# Patient Record
Sex: FEMALE | Race: WHITE | NOT HISPANIC OR LATINO
[De-identification: names, ages, dates, MRNs, and addresses within clinical notes are randomized per-mention and may not be internally consistent; named-entity substitution may affect disease eponyms.]

---

## 2017-10-16 ENCOUNTER — OTHER (OUTPATIENT)
Age: 57
End: 2017-10-16

## 2018-02-16 ENCOUNTER — TRANSCRIPTION ENCOUNTER (OUTPATIENT)
Age: 58
End: 2018-02-16

## 2018-12-11 ENCOUNTER — APPOINTMENT (OUTPATIENT)
Dept: OBGYN | Facility: CLINIC | Age: 58
End: 2018-12-11
Payer: COMMERCIAL

## 2018-12-11 VITALS
WEIGHT: 115 LBS | RESPIRATION RATE: 16 BRPM | SYSTOLIC BLOOD PRESSURE: 116 MMHG | BODY MASS INDEX: 21.16 KG/M2 | OXYGEN SATURATION: 98 % | DIASTOLIC BLOOD PRESSURE: 78 MMHG | HEIGHT: 62 IN | HEART RATE: 54 BPM

## 2018-12-11 DIAGNOSIS — Z13.820 ENCOUNTER FOR SCREENING FOR OSTEOPOROSIS: ICD-10-CM

## 2018-12-11 DIAGNOSIS — Z12.31 ENCOUNTER FOR SCREENING MAMMOGRAM FOR MALIGNANT NEOPLASM OF BREAST: ICD-10-CM

## 2018-12-11 DIAGNOSIS — A63.0 ANOGENITAL (VENEREAL) WARTS: ICD-10-CM

## 2018-12-11 DIAGNOSIS — K62.0 ANAL POLYP: ICD-10-CM

## 2018-12-11 DIAGNOSIS — Z87.42 PERSONAL HISTORY OF OTHER DISEASES OF THE FEMALE GENITAL TRACT: ICD-10-CM

## 2018-12-11 PROCEDURE — 99396 PREV VISIT EST AGE 40-64: CPT

## 2018-12-12 LAB — HPV HIGH+LOW RISK DNA PNL CVX: NOT DETECTED

## 2018-12-17 ENCOUNTER — APPOINTMENT (OUTPATIENT)
Dept: OBGYN | Facility: CLINIC | Age: 58
End: 2018-12-17
Payer: COMMERCIAL

## 2018-12-17 ENCOUNTER — ASOB RESULT (OUTPATIENT)
Age: 58
End: 2018-12-17

## 2018-12-17 LAB — CYTOLOGY CVX/VAG DOC THIN PREP: NORMAL

## 2018-12-17 PROCEDURE — 76830 TRANSVAGINAL US NON-OB: CPT

## 2019-01-11 ENCOUNTER — APPOINTMENT (OUTPATIENT)
Dept: OBGYN | Facility: CLINIC | Age: 59
End: 2019-01-11
Payer: COMMERCIAL

## 2019-01-11 VITALS
SYSTOLIC BLOOD PRESSURE: 110 MMHG | HEART RATE: 51 BPM | OXYGEN SATURATION: 98 % | HEIGHT: 62 IN | WEIGHT: 115 LBS | DIASTOLIC BLOOD PRESSURE: 70 MMHG | BODY MASS INDEX: 21.16 KG/M2

## 2019-01-11 PROCEDURE — 99214 OFFICE O/P EST MOD 30 MIN: CPT

## 2019-01-11 NOTE — CHIEF COMPLAINT
[Follow Up] : follow up GYN visit [FreeTextEntry1] : pt here today to discuss genetics test report-CHEK 2 pos

## 2019-01-25 ENCOUNTER — OTHER (OUTPATIENT)
Age: 59
End: 2019-01-25

## 2019-01-26 ENCOUNTER — OTHER (OUTPATIENT)
Age: 59
End: 2019-01-26

## 2019-08-25 ENCOUNTER — TRANSCRIPTION ENCOUNTER (OUTPATIENT)
Age: 59
End: 2019-08-25

## 2020-04-29 ENCOUNTER — TRANSCRIPTION ENCOUNTER (OUTPATIENT)
Age: 60
End: 2020-04-29

## 2020-06-25 ENCOUNTER — APPOINTMENT (OUTPATIENT)
Dept: OBGYN | Facility: CLINIC | Age: 60
End: 2020-06-25
Payer: COMMERCIAL

## 2020-06-25 VITALS
BODY MASS INDEX: 21.9 KG/M2 | HEIGHT: 62 IN | SYSTOLIC BLOOD PRESSURE: 110 MMHG | WEIGHT: 119 LBS | DIASTOLIC BLOOD PRESSURE: 70 MMHG | HEART RATE: 63 BPM | TEMPERATURE: 97.8 F | OXYGEN SATURATION: 99 %

## 2020-06-25 DIAGNOSIS — R10.2 PELVIC AND PERINEAL PAIN: ICD-10-CM

## 2020-06-25 DIAGNOSIS — N83.201 UNSPECIFIED OVARIAN CYST, RIGHT SIDE: ICD-10-CM

## 2020-06-25 DIAGNOSIS — R92.8 OTHER ABNORMAL AND INCONCLUSIVE FINDINGS ON DIAGNOSTIC IMAGING OF BREAST: ICD-10-CM

## 2020-06-25 PROCEDURE — 99396 PREV VISIT EST AGE 40-64: CPT

## 2020-06-26 NOTE — HISTORY OF PRESENT ILLNESS
[Postmenopausal] : is postmenopausal [Good] : being in good health [HPV Vaccine NA Due to Age] : HPV vaccine not available to patient due to age [Sexually Active] : is sexually active [Last Bone Density ___] : Last bone density studies [unfilled] [Last Mammogram ___] : Last Mammogram was [unfilled] [Last Colonoscopy ___] : Last colonoscopy [unfilled] [Last Pap ___] : Last cervical pap smear was [unfilled]

## 2020-06-26 NOTE — CHIEF COMPLAINT
[Annual Visit] : annual visit [FreeTextEntry1] : --mom 42--colon,patGM pancreatic 70s\par pap 2015,mammo 2017,colon 2018\par Rec Relizen for hot flashes\par Pt CHEK 2 pos--pt seeing breast MD and f/u with colorectal MD as well

## 2020-06-26 NOTE — PHYSICAL EXAM
[Awake] : awake [Alert] : alert [Oriented x3] : oriented to person, place, and time [Soft] : soft [No Bleeding] : there was no active vaginal bleeding [Normal] : uterus [Uterine Adnexae] : were not tender and not enlarged [Acute Distress] : no acute distress [Mass] : no breast mass [Axillary LAD] : no axillary lymphadenopathy [Nipple Discharge] : no nipple discharge [Tender] : non tender

## 2020-06-27 LAB — HPV HIGH+LOW RISK DNA PNL CVX: NOT DETECTED

## 2020-06-30 LAB — CYTOLOGY CVX/VAG DOC THIN PREP: ABNORMAL

## 2021-01-27 ENCOUNTER — TRANSCRIPTION ENCOUNTER (OUTPATIENT)
Age: 61
End: 2021-01-27

## 2021-05-19 ENCOUNTER — TRANSCRIPTION ENCOUNTER (OUTPATIENT)
Age: 61
End: 2021-05-19

## 2021-06-28 ENCOUNTER — APPOINTMENT (OUTPATIENT)
Dept: OBGYN | Facility: CLINIC | Age: 61
End: 2021-06-28
Payer: COMMERCIAL

## 2021-06-28 VITALS
WEIGHT: 112 LBS | SYSTOLIC BLOOD PRESSURE: 112 MMHG | DIASTOLIC BLOOD PRESSURE: 75 MMHG | HEIGHT: 62 IN | HEART RATE: 69 BPM | BODY MASS INDEX: 20.61 KG/M2

## 2021-06-28 PROCEDURE — 99396 PREV VISIT EST AGE 40-64: CPT

## 2021-06-28 PROCEDURE — 99072 ADDL SUPL MATRL&STAF TM PHE: CPT

## 2021-06-28 NOTE — HISTORY OF PRESENT ILLNESS
[Good] : being in good health [Last Mammogram ___] : Last Mammogram was [unfilled] [Last Bone Density ___] : Last bone density studies [unfilled] [Last Colonoscopy ___] : Last colonoscopy [unfilled] [Last Pap ___] : Last cervical pap smear was [unfilled] [Sexually Active] : is sexually active [Postmenopausal] : is postmenopausal [HPV Vaccine NA Due to Age] : HPV vaccine not available to patient due to age [TextBox_4] : pap 2020\par mammo 2021\par colon 2019\par dexa 2019\par son 28 yo [employer]--stationed in [place] and working with the t

## 2021-10-02 ENCOUNTER — TRANSCRIPTION ENCOUNTER (OUTPATIENT)
Age: 61
End: 2021-10-02

## 2022-08-02 ENCOUNTER — APPOINTMENT (OUTPATIENT)
Dept: OBGYN | Facility: CLINIC | Age: 62
End: 2022-08-02

## 2022-08-02 VITALS
DIASTOLIC BLOOD PRESSURE: 77 MMHG | WEIGHT: 117 LBS | HEIGHT: 62 IN | HEART RATE: 55 BPM | SYSTOLIC BLOOD PRESSURE: 151 MMHG | BODY MASS INDEX: 21.53 KG/M2

## 2022-08-02 DIAGNOSIS — Z80.0 FAMILY HISTORY OF MALIGNANT NEOPLASM OF DIGESTIVE ORGANS: ICD-10-CM

## 2022-08-02 DIAGNOSIS — M85.80 OTHER SPECIFIED DISORDERS OF BONE DENSITY AND STRUCTURE, UNSPECIFIED SITE: ICD-10-CM

## 2022-08-02 PROCEDURE — 99214 OFFICE O/P EST MOD 30 MIN: CPT | Mod: 25

## 2022-08-02 PROCEDURE — 99396 PREV VISIT EST AGE 40-64: CPT

## 2022-08-02 NOTE — HISTORY OF PRESENT ILLNESS
[Mammogramdate] : April 2022 [TextBox_19] : BIRAD 2 [PapSmeardate] : June 2020 [TextBox_31] : neg pap [BoneDensityDate] : Jan 2019 [TextBox_37] : osteopenia [TextBox_43] : per pt 2019

## 2022-08-02 NOTE — PHYSICAL EXAM
[Chaperone Present] : A chaperone was present in the examining room during all aspects of the physical examination [Appropriately responsive] : appropriately responsive [Alert] : alert [No Acute Distress] : no acute distress [Soft] : soft [Non-tender] : non-tender [No Lesions] : no lesions [Oriented x3] : oriented x3 [Examination Of The Breasts] : a normal appearance [No Discharge] : no discharge [No Masses] : no breast masses were palpable [Labia Majora] : normal [Labia Minora] : normal [Atrophy] : atrophy [No Bleeding] : There was no active vaginal bleeding [Normal] : normal [Tenderness] : nontender [Uterine Adnexae] : non-palpable

## 2022-08-05 ENCOUNTER — APPOINTMENT (OUTPATIENT)
Dept: OBGYN | Facility: CLINIC | Age: 62
End: 2022-08-05

## 2022-08-05 ENCOUNTER — ASOB RESULT (OUTPATIENT)
Age: 62
End: 2022-08-05

## 2022-08-05 PROCEDURE — 76830 TRANSVAGINAL US NON-OB: CPT

## 2022-08-08 ENCOUNTER — NON-APPOINTMENT (OUTPATIENT)
Age: 62
End: 2022-08-08

## 2022-08-29 ENCOUNTER — NON-APPOINTMENT (OUTPATIENT)
Age: 62
End: 2022-08-29

## 2022-10-20 ENCOUNTER — NON-APPOINTMENT (OUTPATIENT)
Age: 62
End: 2022-10-20

## 2023-07-06 ENCOUNTER — APPOINTMENT (OUTPATIENT)
Dept: OBGYN | Facility: CLINIC | Age: 63
End: 2023-07-06
Payer: COMMERCIAL

## 2023-07-06 VITALS
DIASTOLIC BLOOD PRESSURE: 87 MMHG | BODY MASS INDEX: 22.82 KG/M2 | SYSTOLIC BLOOD PRESSURE: 145 MMHG | WEIGHT: 124 LBS | HEART RATE: 51 BPM | HEIGHT: 62 IN

## 2023-07-06 DIAGNOSIS — R30.0 DYSURIA: ICD-10-CM

## 2023-07-06 PROCEDURE — 99213 OFFICE O/P EST LOW 20 MIN: CPT

## 2023-07-10 ENCOUNTER — NON-APPOINTMENT (OUTPATIENT)
Age: 63
End: 2023-07-10

## 2023-07-10 ENCOUNTER — APPOINTMENT (OUTPATIENT)
Dept: OBGYN | Facility: CLINIC | Age: 63
End: 2023-07-10
Payer: COMMERCIAL

## 2023-07-10 ENCOUNTER — ASOB RESULT (OUTPATIENT)
Age: 63
End: 2023-07-10

## 2023-07-10 DIAGNOSIS — N39.0 URINARY TRACT INFECTION, SITE NOT SPECIFIED: ICD-10-CM

## 2023-07-10 LAB
BACTERIA UR CULT: ABNORMAL
CANDIDA VAG CYTO: NOT DETECTED
G VAGINALIS+PREV SP MTYP VAG QL MICRO: NOT DETECTED
T VAGINALIS VAG QL WET PREP: NOT DETECTED

## 2023-07-10 PROCEDURE — 76830 TRANSVAGINAL US NON-OB: CPT

## 2023-07-11 ENCOUNTER — TRANSCRIPTION ENCOUNTER (OUTPATIENT)
Age: 63
End: 2023-07-11

## 2023-07-13 ENCOUNTER — NON-APPOINTMENT (OUTPATIENT)
Age: 63
End: 2023-07-13

## 2023-07-13 NOTE — PLAN
[FreeTextEntry1] : pelvic pain and vaginal discharge \par -Urine culture and affirm performed today\par -Pt scheduled for pelvic US on Monday

## 2023-07-13 NOTE — HISTORY OF PRESENT ILLNESS
[FreeTextEntry1] : 63 year old menopausal female presents today for follow up visit for abdominal pain and urinary frequency. Pt c/o lower abdominal pain, cramping and urinary urgency. \par \par \par \par \par \par \par \par

## 2023-10-16 ENCOUNTER — NON-APPOINTMENT (OUTPATIENT)
Age: 63
End: 2023-10-16

## 2023-10-16 ENCOUNTER — APPOINTMENT (OUTPATIENT)
Dept: SURGICAL ONCOLOGY | Facility: CLINIC | Age: 63
End: 2023-10-16
Payer: COMMERCIAL

## 2023-10-16 VITALS
BODY MASS INDEX: 21.16 KG/M2 | RESPIRATION RATE: 16 BRPM | WEIGHT: 115 LBS | HEART RATE: 57 BPM | SYSTOLIC BLOOD PRESSURE: 147 MMHG | OXYGEN SATURATION: 97 % | HEIGHT: 62 IN | DIASTOLIC BLOOD PRESSURE: 89 MMHG

## 2023-10-16 DIAGNOSIS — Z15.01 GENETIC SUSCEPTIBILITY TO MALIGNANT NEOPLASM OF BREAST: ICD-10-CM

## 2023-10-16 PROCEDURE — 99204 OFFICE O/P NEW MOD 45 MIN: CPT

## 2023-10-25 ENCOUNTER — NON-APPOINTMENT (OUTPATIENT)
Age: 63
End: 2023-10-25

## 2023-10-25 ENCOUNTER — APPOINTMENT (OUTPATIENT)
Dept: OBGYN | Facility: CLINIC | Age: 63
End: 2023-10-25
Payer: COMMERCIAL

## 2023-10-25 VITALS — SYSTOLIC BLOOD PRESSURE: 120 MMHG | DIASTOLIC BLOOD PRESSURE: 77 MMHG

## 2023-10-25 VITALS
WEIGHT: 121 LBS | HEIGHT: 62 IN | HEART RATE: 54 BPM | SYSTOLIC BLOOD PRESSURE: 149 MMHG | DIASTOLIC BLOOD PRESSURE: 90 MMHG | BODY MASS INDEX: 22.26 KG/M2

## 2023-10-25 DIAGNOSIS — Z82.49 FAMILY HISTORY OF ISCHEMIC HEART DISEASE AND OTHER DISEASES OF THE CIRCULATORY SYSTEM: ICD-10-CM

## 2023-10-25 DIAGNOSIS — Z01.419 ENCOUNTER FOR GYNECOLOGICAL EXAMINATION (GENERAL) (ROUTINE) W/OUT ABNORMAL FINDINGS: ICD-10-CM

## 2023-10-25 PROCEDURE — 99396 PREV VISIT EST AGE 40-64: CPT

## 2023-10-25 RX ORDER — MAGNESIUM OXIDE/MAG AA CHELATE 300 MG
CAPSULE ORAL
Refills: 0 | Status: ACTIVE | COMMUNITY

## 2023-10-25 RX ORDER — CHROMIUM 200 MCG
TABLET ORAL
Refills: 0 | Status: ACTIVE | COMMUNITY

## 2023-10-25 RX ORDER — NITROFURANTOIN MACROCRYSTALS 100 MG/1
100 CAPSULE ORAL
Qty: 10 | Refills: 0 | Status: DISCONTINUED | COMMUNITY
Start: 2023-07-10 | End: 2023-10-25

## 2023-10-27 LAB — HPV HIGH+LOW RISK DNA PNL CVX: NOT DETECTED

## 2023-10-30 LAB — CYTOLOGY CVX/VAG DOC THIN PREP: ABNORMAL

## 2023-11-13 DIAGNOSIS — R92.30 INCONCLUSIVE MAMMOGRAM: ICD-10-CM

## 2023-11-13 DIAGNOSIS — R92.2 INCONCLUSIVE MAMMOGRAM: ICD-10-CM

## 2023-12-31 NOTE — ASSESSMENT
[FreeTextEntry1] : 63-year-old lady.  Today she is asymptomatic with normal breast examination.  + Prior personal history: Left breast (10:00-11:00) core biopsy without clip: Fibroadenoma. Previous excisional left breast biopsy x2.  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  May 2023: Bilateral mammogram and sonogram at [place]: BI-RADS 2.  I suggested a baseline breast MRI. She understands and agrees. Prescription provided for [facility].  I have asked her to call me a week after the imaging to discuss the results.  If asymptomatic, with normal imaging, I have suggested: Yearly mammogram and breast ultrasounds, resuming in May 2024. Annual follow-up with us.  Reviewed in detail, all questions answered.  Referring physician contacted through secure email.   11/13/2023: Bilateral breast MRI (baseline) at [facility]: BI-RADS 1.

## 2023-12-31 NOTE — HISTORY OF PRESENT ILLNESS
[de-identified] : 63-year-old lady.  Referred by her gynecologist: Dr. Manda BARRERA.  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 This was identified on genetic testing performed in 2019 by her gynecologist (color assay), due to family history of colorectal cancer (below).   CC: No signs or symptoms related to either breast.   + Prior personal history: : Right breast sonogram guided core needle biopsy: Fibroadenoma. No clip placed.  ~: Left breast excisional biopsy x2, both benign.  No other procedures related to either breast.   No personal history of malignancy.   No family history of breast cancer. No relatives with ovarian cancer.  Not Ashkenazi.  + Family history of malignancy: Mother was diagnosed with colorectal cancer at age 42, and  at 44. Paternal aunt: Pancreatic cancer at 75.   5/15/2023: Bilateral annual mammogram and sonogram at [place]: BI-RADS 2. No previous breast MRI.   Menarche at 13.  1, para 1 at 32. Natural menopause at 54. No HRT.   PMD: Dr. Paul FIGUEROA.  NKDA.  No pacemaker or defibrillator. No anticoagulants.  No current prescription medications.   GYN: Dr. Manda BARRERA. 2023 visit is scheduled.    colonoscopy was normal. + FH: Mother  of colorectal cancer at age 44. Follow-up colonoscopy is scheduled for 2023. GI: Dr. Edel JEAN BAPTISTE.

## 2024-05-03 ENCOUNTER — NON-APPOINTMENT (OUTPATIENT)
Age: 64
End: 2024-05-03

## 2024-11-04 ENCOUNTER — APPOINTMENT (OUTPATIENT)
Dept: OBGYN | Facility: CLINIC | Age: 64
End: 2024-11-04
Payer: COMMERCIAL

## 2024-11-04 VITALS
HEART RATE: 54 BPM | BODY MASS INDEX: 22.82 KG/M2 | SYSTOLIC BLOOD PRESSURE: 150 MMHG | HEIGHT: 62 IN | DIASTOLIC BLOOD PRESSURE: 91 MMHG | WEIGHT: 124 LBS

## 2024-11-04 VITALS — DIASTOLIC BLOOD PRESSURE: 90 MMHG | SYSTOLIC BLOOD PRESSURE: 152 MMHG

## 2024-11-04 DIAGNOSIS — R10.2 PELVIC AND PERINEAL PAIN: ICD-10-CM

## 2024-11-04 DIAGNOSIS — F43.9 REACTION TO SEVERE STRESS, UNSPECIFIED: ICD-10-CM

## 2024-11-04 DIAGNOSIS — Z01.419 ENCOUNTER FOR GYNECOLOGICAL EXAMINATION (GENERAL) (ROUTINE) W/OUT ABNORMAL FINDINGS: ICD-10-CM

## 2024-11-04 DIAGNOSIS — R03.0 ELEVATED BLOOD-PRESSURE READING, W/OUT DIAGNOSIS OF HYPERTENSION: ICD-10-CM

## 2024-11-04 PROCEDURE — 99396 PREV VISIT EST AGE 40-64: CPT

## 2024-11-04 PROCEDURE — 99213 OFFICE O/P EST LOW 20 MIN: CPT | Mod: 25

## 2024-11-19 ENCOUNTER — NON-APPOINTMENT (OUTPATIENT)
Age: 64
End: 2024-11-19

## 2024-12-05 ENCOUNTER — APPOINTMENT (OUTPATIENT)
Dept: SURGICAL ONCOLOGY | Facility: CLINIC | Age: 64
End: 2024-12-05
Payer: COMMERCIAL

## 2024-12-05 VITALS
BODY MASS INDEX: 22.08 KG/M2 | WEIGHT: 120 LBS | OXYGEN SATURATION: 98 % | HEIGHT: 62 IN | SYSTOLIC BLOOD PRESSURE: 134 MMHG | DIASTOLIC BLOOD PRESSURE: 85 MMHG | HEART RATE: 61 BPM

## 2024-12-05 DIAGNOSIS — Z12.39 ENCOUNTER FOR OTHER SCREENING FOR MALIGNANT NEOPLASM OF BREAST: ICD-10-CM

## 2024-12-05 PROCEDURE — 99214 OFFICE O/P EST MOD 30 MIN: CPT

## 2024-12-06 PROBLEM — Z12.39 BREAST CANCER SCREENING: Status: ACTIVE | Noted: 2018-12-11
